# Patient Record
(demographics unavailable — no encounter records)

---

## 2024-11-20 NOTE — PHYSICAL EXAM
[Normal] : mucosa is normal [Midline] : trachea located in midline position [] : [place]-Hallpike test is negative

## 2024-11-20 NOTE — ASSESSMENT
[FreeTextEntry1] : - 11/20/24: 37 y/o F presents with constant room spinning sensation, imbalance, and head "fog" for the past 6 days. Romberg, Tandem Romberg, and [place] Hallpike were negative, unable to illicit dizziness today. Given recent upper respiratory infection I suspect she possibly had vestibular neuronitis.  I am recommending VNG and to follow up after for review. For known mild sensorineural hearing loss I am recommending audio/tymps as well and to follow up after.  -audio/tymps -VNG -Follow up after above

## 2024-11-20 NOTE — ASSESSMENT
[FreeTextEntry1] : - 11/20/24: 35 y/o F presents with constant room spinning sensation, imbalance, and head "fog" for the past 6 days. Romberg, Tandem Romberg, and [place] Hallpike were negative, unable to illicit dizziness today. Given recent upper respiratory infection I suspect she possibly had vestibular neuronitis.  I am recommending VNG and to follow up after for review. For known mild sensorineural hearing loss I am recommending audio/tymps as well and to follow up after.  -audio/tymps -VNG -Follow up after above

## 2024-11-20 NOTE — PHYSICAL EXAM
[Normal] : mucosa is normal [Midline] : trachea located in midline position [] : [place]-Hallpike test is negative

## 2024-11-20 NOTE — HISTORY OF PRESENT ILLNESS
[de-identified] : 11/20/24: 37 y/o F presents with constant room spinning sensation, lightheadedness, imbalance, and head "fog" for the past 6 days. No triggers. On the first day she had multiple episodes of emesis, then one episode the second day. No falls or loss of consciousness. She also endorses pressure in neck which radiates to ears. She had an upper respiratory infection 1.5 weeks ago which lasted for 1-2 days and resolved. She has never had anything like this before. No history of headaches or migraines. She denies hearing loss, tinnitus, otorrhea. She went to urgent care 3 days ago, found to have possible fluid in ears. She was prescribed Azelastine and Meclizine. +qtips. -bruxism.  Last audiogram was in 11/2022 which showed normal to mild sensorineural hearing loss at 1 kHz sloping back to normal.

## 2024-11-20 NOTE — HISTORY OF PRESENT ILLNESS
[de-identified] : 11/20/24: 35 y/o F presents with constant room spinning sensation, lightheadedness, imbalance, and head "fog" for the past 6 days. No triggers. On the first day she had multiple episodes of emesis, then one episode the second day. No falls or loss of consciousness. She also endorses pressure in neck which radiates to ears. She had an upper respiratory infection 1.5 weeks ago which lasted for 1-2 days and resolved. She has never had anything like this before. No history of headaches or migraines. She denies hearing loss, tinnitus, otorrhea. She went to urgent care 3 days ago, found to have possible fluid in ears. She was prescribed Azelastine and Meclizine. +qtips. -bruxism.  Last audiogram was in 11/2022 which showed normal to mild sensorineural hearing loss at 1 kHz sloping back to normal.

## 2024-12-26 NOTE — DATA REVIEWED
Returned call.   TSH 21.68 T4 pending.   They are wondering if they should treat his thyroid or if its amiodarone induced with no chance of improvement.  He was on amiodarone 6-8 months at the most and has been off since 6/7/21 so should be out of system by now.  They will wait for T4 and treat his thyroid disease as hypothyroid.   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

## 2024-12-26 NOTE — PROCEDURE
[Posterior Lesion] : posterior lesion [Anterior rhinoscopy insufficient to account for symptoms] : anterior rhinoscopy insufficient to account for symptoms [Topical Lidocaine] : topical lidocaine [Oxymetazoline HCl] : oxymetazoline HCl [Flexible Endoscope] : examined with the flexible endoscope [Serial Number: ___] : Serial Number: [unfilled] [Normal] : the nasopharynx was normal [de-identified] : done as she is concerned she has sinus infx and omus could not be seen with speculum [FreeTextEntry6] : Mucosa: b nl  Mucus:b nl Polyps:b nl Inferior turbinate:b nl Middle turbinate:b nl Superior turbinate:b nl Inferior Meatus:b nl Middle Meatus:b nl Superior meatus:b nl Sphenoethmoidal recess:b nl

## 2024-12-26 NOTE — PHYSICAL EXAM
[de-identified] : b [Nasal Endoscopy Performed] : nasal endoscopy was performed, see procedure section for findings [Midline] : trachea located in midline position [Normal] : no rashes

## 2024-12-26 NOTE — HISTORY OF PRESENT ILLNESS
[de-identified] : 38 yo f with vertigo - she has seen Dr Thomas, Dr Dawn and [facility] and came here for follow up. She did not bring records [facility] She gets vertigo first time 11.2024 lasted 2 weeks took medrol dosepacks x 2 ; also felt she had a sinus infection pressure in cheeks nose forehead and temples. and top of her head - did not take abx; Currently has a frontal headache. Using budesonide nasal irrigation - improved slightly. Vertigo is accompanied by nausea and vomiting. Had an MRI.

## 2024-12-26 NOTE — ASSESSMENT
[FreeTextEntry1] : likely migraine - can be vestibular and sinonasal also pontine lesion seen on mri needs to see neuro - asst gave her contact info for [facility] she see them asap tmj- soft diet nsaids as tolerated, avoid bruxism, see dentist - she agreed zpack as she is sure she has sinusitis despite mri and said it is worse since then continue with nasal rinses follow up with new  and vng